# Patient Record
Sex: MALE | Race: ASIAN | Employment: FULL TIME | URBAN - METROPOLITAN AREA
[De-identification: names, ages, dates, MRNs, and addresses within clinical notes are randomized per-mention and may not be internally consistent; named-entity substitution may affect disease eponyms.]

---

## 2018-02-08 ENCOUNTER — HOSPITAL ENCOUNTER (OUTPATIENT)
Age: 38
Discharge: HOME | End: 2018-02-08
Attending: FAMILY MEDICINE
Payer: COMMERCIAL

## 2018-02-08 VITALS
WEIGHT: 160 LBS | TEMPERATURE: 98 F | DIASTOLIC BLOOD PRESSURE: 92 MMHG | HEART RATE: 57 BPM | SYSTOLIC BLOOD PRESSURE: 124 MMHG | RESPIRATION RATE: 18 BRPM | OXYGEN SATURATION: 99 %

## 2018-02-08 DIAGNOSIS — J02.0 STREPTOCOCCAL SORE THROAT: ICD-10-CM

## 2018-02-08 DIAGNOSIS — J06.9 ACUTE URI: Primary | ICD-10-CM

## 2018-02-08 LAB — POCT RAPID STREP: POSITIVE

## 2018-02-08 PROCEDURE — 99204 OFFICE O/P NEW MOD 45 MIN: CPT

## 2018-02-08 PROCEDURE — 87430 STREP A AG IA: CPT | Performed by: FAMILY MEDICINE

## 2018-02-08 PROCEDURE — 99203 OFFICE O/P NEW LOW 30 MIN: CPT

## 2018-02-08 RX ORDER — IBUPROFEN 600 MG/1
600 TABLET ORAL EVERY 8 HOURS PRN
Qty: 30 TABLET | Refills: 0 | Status: SHIPPED | OUTPATIENT
Start: 2018-02-08 | End: 2018-02-15

## 2018-02-08 RX ORDER — AMOXICILLIN 875 MG/1
875 TABLET, COATED ORAL 2 TIMES DAILY
Qty: 20 TABLET | Refills: 0 | Status: SHIPPED | OUTPATIENT
Start: 2018-02-08 | End: 2018-02-18

## 2018-02-09 NOTE — ED PROVIDER NOTES
Patient Seen in: [address] [facility]    History   Patient presents with:  Cough/URI    Stated Complaint: head cold x2 days    HPI    47-year-old male presents with chief complaints of body aches, chills, sore throat, cough, runny no Strep Positive (*)     All other components within normal limits       ED Course as of Feb 08 2017  ------------------------------------------------------------       MDM   rapid strep +  Amoxicillin 875 mg twice daily for 10 days.   Ibuprofen for pain   Sa

## 2018-02-09 NOTE — ED INITIAL ASSESSMENT (HPI)
Pt c/o symptoms since 2/6/18 Tuesday night, sore throat, weakness, body aches, cough intermittently, runny nose and headache. Denies fevers or chills, denies abdominal pain, nausea, vomiting or SOB.

## 2024-08-20 ENCOUNTER — ORDER TRANSCRIPTION (OUTPATIENT)
Dept: ADMINISTRATIVE | Facility: HOSPITAL | Age: 44
End: 2024-08-20

## 2024-08-20 DIAGNOSIS — Z13.6 SCREENING FOR CARDIOVASCULAR CONDITION: Primary | ICD-10-CM

## 2024-08-28 ENCOUNTER — HOSPITAL ENCOUNTER (OUTPATIENT)
Dept: CT IMAGING | Facility: HOSPITAL | Age: 44
Discharge: HOME | End: 2024-08-28
Attending: FAMILY MEDICINE

## 2024-08-28 VITALS
HEIGHT: 67.76 IN | BODY MASS INDEX: 24.53 KG/M2 | DIASTOLIC BLOOD PRESSURE: 80 MMHG | SYSTOLIC BLOOD PRESSURE: 130 MMHG | WEIGHT: 160 LBS

## 2024-08-28 DIAGNOSIS — Z13.6 SCREENING FOR CARDIOVASCULAR CONDITION: ICD-10-CM

## 2024-08-28 LAB
GLUCOSE POC: 92 MG/DL (ref 70–100)
HDL POC: 33 MG/DL (ref 40–60)
LDL POC: 144 MG/DL (ref 0–130)
TC/HDL RATIO: 6 (ref 0–5)
TOTAL CHOLESTEROL POC: 196 MG/DL (ref 0–200)
TRIGLYCERIDES POC: 92 MG/DL (ref 0–200)

## 2024-08-28 NOTE — PROGRESS NOTES
Date of Service 8/28/2024    DAVID LINK  Date of Birth 1/26/1980    Patient Age: 44 year old    PCP: Ramón Wilson MD  [address]  [address]  [place] [facility] [address]    Heart Scan Consult  Preliminary Heart Scan Score: 0    Previous Screening  Heart Scan Completed Previously: No        Peripheral Vascular Scan Completed Previously: No          Risk Factors  Personal Risk Factors  Non-alterable Risk Factors: Family History;Personal History;Age;Gender (Patient's father has high cholesterol and had carotid endarectomy. Patient's LDL high)  Alterable Risk Factors: Abnormal Cholesterol;Lack of exercise;Unhealthy eating      Body Mass Index  Body mass index is 24.5 kg/m².    Blood Pressure     /80 (BP Location: Right arm)     (Normal =< 120/80,  Elevated = 120-129/ >80,  High Stage1 130-139/80-89 , Stage2 >140/>90)    Lipid Profile  Patient was in fasting state: Yes    Cholesterol: 196, done on 8/28/2024.  HDL Cholesterol: 33, done on 8/28/2024.  LDL Cholesterol: 144, done on 8/28/2024.  TriGlycerides 92, done on 8/28/2024.    Cholesterol Goals  Value   Total  =< 200   HDL  = > 45 Men = > 55 Women   LDL   =< 100   Triglycerides  =< 150       Glucose and Hemoglobin A1C  Lab Results   Component Value Date    GLU 92 08/28/2024     (Normal Fasting Glucose < 100mg/dl )    Nurse Review  Risk factor information and results reviewed with Nurse: Yes    Recommended Follow Up:  Consult your physician regarding:: Final Heart Scan Report;Discuss potential for Incidental Finding;Discuss Potential for Score Variance      Recommendations for Change:  Nutrition Changes: Low Saturated Fat;Low Fat Dairy;Low Salt Eating (Patient is vegetarian. Reports using a lot of ghee and encouraged to switch to healhier oil. Eats some fish, nuts.)    Cholesterol Modification (goal of therapy depends upon your risk): Increase HDL (Healthy/Good) Normal >45 Men >55 Women;Decrease LDL (Lousy/Bad) Ideal <100    Exercise: Enhance Current Program (Weight  training now, encouraged to add aerobic exercise.)         Weight Management: Maintain Current Weight    Stress Management: Adopt Stress Management Techniques    Repeat Heart Scan: 5 years if Calcium Score is 0.0;Discuss with your Physician              [facility] Recommended Resources:  Recommended Resources: Upcoming Classes, Medical Services and Health Library [email or web address]            Janee WALKER RN        Please Contact the Nurse Heart Line with any Questions or Concerns [phone].